# Patient Record
Sex: FEMALE | Race: WHITE | URBAN - METROPOLITAN AREA
[De-identification: names, ages, dates, MRNs, and addresses within clinical notes are randomized per-mention and may not be internally consistent; named-entity substitution may affect disease eponyms.]

---

## 2017-10-17 ENCOUNTER — HOSPITAL ENCOUNTER (EMERGENCY)
Facility: CLINIC | Age: 22
Discharge: HOME | End: 2017-10-17
Attending: INTERNAL MEDICINE | Admitting: INTERNAL MEDICINE

## 2017-10-17 VITALS
SYSTOLIC BLOOD PRESSURE: 117 MMHG | OXYGEN SATURATION: 99 % | RESPIRATION RATE: 18 BRPM | DIASTOLIC BLOOD PRESSURE: 74 MMHG | BODY MASS INDEX: 20.38 KG/M2 | TEMPERATURE: 98.2 F | HEIGHT: 63 IN | HEART RATE: 80 BPM | WEIGHT: 115 LBS

## 2017-10-17 DIAGNOSIS — F07.81 POST CONCUSSION SYNDROME: ICD-10-CM

## 2017-10-17 DIAGNOSIS — R42 DIZZINESS: ICD-10-CM

## 2017-10-17 LAB
ALBUMIN UR-MCNC: NEGATIVE MG/DL
APPEARANCE UR: CLEAR
BILIRUB UR QL STRIP: NEGATIVE
COLOR UR AUTO: NORMAL
GLUCOSE UR STRIP-MCNC: NEGATIVE MG/DL
HCG UR QL: NEGATIVE
HGB UR QL STRIP: NEGATIVE
INTERNAL QC OK POCT: YES
KETONES UR STRIP-MCNC: NEGATIVE MG/DL
LEUKOCYTE ESTERASE UR QL STRIP: NEGATIVE
NITRATE UR QL: NEGATIVE
PH UR STRIP: 5.5 PH (ref 5–7)
SOURCE: NORMAL
SP GR UR STRIP: 1.01 (ref 1–1.03)
UROBILINOGEN UR STRIP-MCNC: NORMAL MG/DL (ref 0–2)

## 2017-10-17 PROCEDURE — 93005 ELECTROCARDIOGRAM TRACING: CPT | Performed by: INTERNAL MEDICINE

## 2017-10-17 PROCEDURE — 81003 URINALYSIS AUTO W/O SCOPE: CPT | Performed by: INTERNAL MEDICINE

## 2017-10-17 PROCEDURE — 81025 URINE PREGNANCY TEST: CPT | Performed by: INTERNAL MEDICINE

## 2017-10-17 PROCEDURE — 99284 EMERGENCY DEPT VISIT MOD MDM: CPT | Performed by: INTERNAL MEDICINE

## 2017-10-17 PROCEDURE — 93010 ELECTROCARDIOGRAM REPORT: CPT | Mod: Z6 | Performed by: INTERNAL MEDICINE

## 2017-10-17 PROCEDURE — 99283 EMERGENCY DEPT VISIT LOW MDM: CPT | Mod: 25 | Performed by: INTERNAL MEDICINE

## 2017-10-17 ASSESSMENT — ENCOUNTER SYMPTOMS
SHORTNESS OF BREATH: 0
ABDOMINAL PAIN: 0
DIZZINESS: 1
HEADACHES: 1
FEVER: 0

## 2017-10-17 NOTE — ED PROVIDER NOTES
"  History     Chief Complaint   Patient presents with     Fall     head pain     Dizziness     HPI  Brittani Nash is an otherwise healthy 22 year old female who presents to the Emergency Department for evaluation of dizziness. The patient reports she had a witnessed fall down stairs on Saturday while intoxicated. She says she is unsure why she fell as she was drinking, but a witness reported she did not suffer any injury. The patient reports since that time she has experienced a headache with associated dizziness. She denies any visual disturbance, but does say her vision feels \"weird\" in her periphery. She denies any change of pregnancy.     Past Medical History:   Diagnosis Date     Allergic state      Scoliosis        History reviewed. No pertinent surgical history.    No family history on file.    Social History   Substance Use Topics     Smoking status: Never Smoker     Smokeless tobacco: Never Used     Alcohol use Yes       No current facility-administered medications for this encounter.      No current outpatient prescriptions on file.      Not on File      I have reviewed the Medications, Allergies, Past Medical and Surgical History, and Social History in the Epic system.    Review of Systems   Constitutional: Negative for fever.   Respiratory: Negative for shortness of breath.    Cardiovascular: Negative for chest pain.   Gastrointestinal: Negative for abdominal pain.   Neurological: Positive for dizziness and headaches.   All other systems reviewed and are negative.      Physical Exam   BP: 117/74  Pulse: 83  Heart Rate: 83  Temp: 98.2  F (36.8  C)  Resp: 16  Height: 160 cm (5' 3\")  Weight: 52.2 kg (115 lb)  SpO2: 99 %       Physical Exam   Constitutional: She is oriented to person, place, and time. No distress.   HENT:   Head: Atraumatic.   Mouth/Throat: Oropharynx is clear and moist. No oropharyngeal exudate.   Eyes: Pupils are equal, round, and reactive to light. No scleral icterus.   Neck: Neck " supple. No JVD present.   Cardiovascular: Normal rate, normal heart sounds and intact distal pulses.  Exam reveals no gallop and no friction rub.    No murmur heard.  Pulmonary/Chest: Effort normal and breath sounds normal. No respiratory distress. She has no wheezes. She has no rales. She exhibits no tenderness.   Abdominal: Soft. Bowel sounds are normal. She exhibits no distension and no mass. There is no tenderness. There is no rebound and no guarding.   Musculoskeletal: She exhibits no edema or tenderness.   Neurological: She is alert and oriented to person, place, and time. No cranial nerve deficit. Coordination normal.   Skin: Skin is warm. No rash noted. She is not diaphoretic.       ED Course     ED Course     Procedures             EKG Interpretation:      Interpreted by Dr. Anna  Time reviewed: 10:28 AM  Symptoms at time of EKG: Dizziness  Rhythm: normal sinus with sinus arrhythmia   Rate: normal  Axis: normal  Ectopy: none  Conduction: normal  ST Segments/ T Waves: No ST-T wave changes  Q Waves: none  Comparison to prior: No old EKG available    Clinical Impression: No long QT or Brugada pattern.     Results for orders placed or performed during the hospital encounter of 10/17/17 (from the past 24 hour(s))   EKG 12-lead, tracing only     Status: None (Preliminary result)    Collection Time: 10/17/17 10:20 AM   Result Value Ref Range    Interpretation ECG Click View Image link to view waveform and result    UA reflex to Microscopic and Culture     Status: None    Collection Time: 10/17/17 10:35 AM   Result Value Ref Range    Color Urine Straw     Appearance Urine Clear     Glucose Urine Negative NEG^Negative mg/dL    Bilirubin Urine Negative NEG^Negative    Ketones Urine Negative NEG^Negative mg/dL    Specific Gravity Urine 1.007 1.003 - 1.035    Blood Urine Negative NEG^Negative    pH Urine 5.5 5.0 - 7.0 pH    Protein Albumin Urine Negative NEG^Negative mg/dL    Urobilinogen mg/dL Normal 0.0 - 2.0 mg/dL     Nitrite Urine Negative NEG^Negative    Leukocyte Esterase Urine Negative NEG^Negative    Source Midstream Urine    hCG qual urine POCT     Status: Normal    Collection Time: 10/17/17 11:21 AM   Result Value Ref Range    HCG Qual Urine Negative neg    Internal QC OK Yes          Labs Ordered and Resulted from Time of ED Arrival Up to the Time of Departure from the ED   HCG QUAL URINE POCT - Normal   UA MACROSCOPIC WITH REFLEX TO MICRO AND CULTURE   CARDIAC CONTINUOUS MONITORING            Assessments & Plan (with Medical Decision Making)  Post concussion syndrome after intoxicated and fall Sat still HA and dizziness, no visual sxs, no anticoagulation-defer CT head knowing poss risk in the young pt and this was also discussed with the pt. No sports, follow up with TBI clinic.  Dizziness, UPT neg, UA neg, EKG without long QT, Brugada pattern or any acute pathologies, probably all due to concussion.       I have reviewed the nursing notes.    I have reviewed the findings, diagnosis, plan and need for follow up with the patient.    There are no discharge medications for this patient.      Final diagnoses:   Post concussion syndrome   Dizziness   I, Nima Olivier, am serving as a trained medical scribe to document services personally performed by Radha Anna MD, based on the provider's statements to me.      IRadha MD, was physically present and have reviewed and verified the accuracy of this note documented by Nima Olivier.       10/17/2017   [facility], [place], EMERGENCY DEPARTMENT     Radha Anna MD  10/17/17 2162

## 2017-10-17 NOTE — LETTER
To Whom it may concern:      Brittani Nash was seen in our Emergency Department today, 10/17/17.  I expect her condition to improve over the next 1 days.  She may return to work/school when improved.    Sincerely,        Radha Anna MD, MD

## 2017-10-17 NOTE — ED NOTES
22-yr female patient - presenting to ED for eval of fall (possible multiple falls) and hitting head on Saturday, last week.  Patient states etoh intoxication at time of fall; denies LOC.  Patient states some neck pain,a swell and body aches, and dizziness.  Airway patent.

## 2017-10-17 NOTE — ED AVS SNAPSHOT
[facility], Emergency Department    [address] [address]    Phone:  [phone]                                       Brittani Nash   MRN: [id number]    Department:  [facility], Emergency Department   Date of Visit:  10/17/2017           Patient Information     Date Of Birth          1995        Your diagnoses for this visit were:     Post concussion syndrome     Dizziness        You were seen by Radha Anna MD.      Discharge References/Attachments     MILD TRAUMATIC BRAIN INJURY (CONCUSSION), TREATMENT FOR  (ENGLISH)      24 Hour Appointment Hotline       To make an appointment at any [facility] clinic, call [phone] ([phone]). If you don't have a family doctor or clinic, we will help you find one. [facility] clinics are conveniently located to serve the needs of you and your family.          ED Discharge Orders     CONCUSSION  REFERRAL       [facility] is referring you to the Concussion  service at [facility].      The  Representative will assist you in the coordination of your concussion care as prescribed by your physician.    The  Representative will contact you within one business day, or you may contact the  Representative at [phone].    Referral Options:  Non-Sports related concussion management    Coverage of these services are subject to the terms and limitations of your health insurance plan.  Please call member services at your health plan with any benefit or coverage questions.     If X-rays, CT or MRI's have been performed, please contact the facility where they were done, to arrange for  prior to your scheduled appointment.  Please bring this referral request to your appointment and present it to your specialist.                     Review of your medicines      Notice     You have not been prescribed any medications.            Procedures and tests performed  "during your visit     Cardiac Continuous Monitoring    EKG 12-lead, tracing only    UA reflex to Microscopic and Culture    hCG qual urine POCT      Orders Needing Specimen Collection     None      Pending Results     Date and Time Order Name Status Description    10/17/2017 1000 EKG 12-lead, tracing only Preliminary             Pending Culture Results     No orders found from 10/15/2017 to 10/18/2017.            Pending Results Instructions     If you had any lab results that were not finalized at the time of your Discharge, you can call the ED Lab Result RN at [phone]. You will be contacted by this team for any positive Lab results or changes in treatment. The nurses are available 7 days a week from 10A to 6:30P.  You can leave a message 24 hours per day and they will return your call.        Thank you for choosing [facility]       Thank you for choosing [facility] for your care. Our goal is always to provide you with excellent care. Hearing back from our patients is one way we can continue to improve our services. Please take a few minutes to complete the written survey that you may receive in the mail after you visit with us. Thank you!        Avaz Information     Avaz lets you send messages to your doctor, view your test results, renew your prescriptions, schedule appointments and more. To sign up, go to [email or web address] . Click on \"Log in\" on the left side of the screen, which will take you to the Welcome page. Then click on \"Sign up Now\" on the right side of the page.     You will be asked to enter the access code listed below, as well as some personal information. Please follow the directions to create your username and password.     Your access code is: [email or web address]  Expires: 1/15/2018 11:50 AM     Your access code will  in 90 days. If you need help or a new code, please call your [facility] clinic or [phone].        Care EveryWhere ID     This is your Care EveryWhere ID. This could " be used by other organizations to access your [facility] medical records  [id number]        Equal Access to Services     JENNIE COOPER : Pham Blakely, rona joyce, ramón davis. So [facility] [phone].    ATENCIÓN: Si habla español, tiene a senior disposición servicios gratuitos de asistencia lingüística. Llame al [phone].    We comply with applicable federal civil rights laws and [place] laws. We do not discriminate on the basis of race, color, national origin, age, disability, sex, sexual orientation, or gender identity.            After Visit Summary       This is your record. Keep this with you and show to your community pharmacist(s) and doctor(s) at your next visit.

## 2017-10-17 NOTE — ED AVS SNAPSHOT
[facility], [place], Emergency Department    [address] [place] [address]    Phone:  [phone]                                       Brittani Nash   MRN: [id number]    Department:  [facility], Emergency Department   Date of Visit:  10/17/2017           After Visit Summary Signature Page     I have received my discharge instructions, and my questions have been answered. I have discussed any challenges I see with this plan with the nurse or doctor.    ..........................................................................................................................................  Patient/Patient Representative Signature      ..........................................................................................................................................  Patient Representative Print Name and Relationship to Patient    ..................................................               ................................................  Date                                            Time    ..........................................................................................................................................  Reviewed by Signature/Title    ...................................................              ..............................................  Date                                                            Time

## 2017-10-18 LAB — INTERPRETATION ECG - MUSE: NORMAL

## 2018-02-01 ENCOUNTER — MEDICAL CORRESPONDENCE (OUTPATIENT)
Dept: HEALTH INFORMATION MANAGEMENT | Facility: CLINIC | Age: 23
End: 2018-02-01

## 2018-02-01 ENCOUNTER — TRANSFERRED RECORDS (OUTPATIENT)
Dept: HEALTH INFORMATION MANAGEMENT | Facility: CLINIC | Age: 23
End: 2018-02-01

## 2018-02-01 NOTE — TELEPHONE ENCOUNTER
Records Received From: [facility]     Date/Exam/Location  (specify location if different)   ED/Hosp Notes: 10/28/08-11/2/08, 12/15/08-12/19/08   Missing: - missing pages

## 2018-02-01 NOTE — TELEPHONE ENCOUNTER
APPT INFO    Date /Time: 2/6/17 9:40AM   Reason for Appt: scoliosis   Ref Provider/Clinic: JEANNETTE HALL/[facility]    Are there internal records? Yes/No?  IF YES, list clinic names: NO   Are there outside records? Yes/No? YES - see below   Patient Contact (Y/N) & Call Details: YES - spoke with patient   Action: Emailed SHAHLA      OUTSIDE RECORDS CHECKLIST     CLINIC NAME COMMENTS REC (x) IMG (x)   [facility] Records scanned into PACS x    [facility] [place]  x x

## 2018-02-01 NOTE — TELEPHONE ENCOUNTER
Records Received From: [facility]     Date/Exam/Location  (specify location if different)   Office Notes: [phone]   Radiology Reports: - xray scoliosis 3/19/14, 3/14/16    Nidia Bellamy Notified (Y/N): no   PT/OT Notes: 2008, 2011, 2009, 2013, 2014

## 2018-02-02 DIAGNOSIS — M41.9 SCOLIOSIS: Primary | ICD-10-CM

## 2018-02-05 NOTE — TELEPHONE ENCOUNTER
Received Imaging From: [facility]    Image Type (x): Disc:_x__  Pacs:___      Exam Date/Name: Multiple images on CD Comments: sent to [place] film room

## 2018-02-06 ENCOUNTER — RADIANT APPOINTMENT (OUTPATIENT)
Dept: GENERAL RADIOLOGY | Facility: CLINIC | Age: 23
End: 2018-02-06
Attending: ORTHOPAEDIC SURGERY
Payer: COMMERCIAL

## 2018-02-06 ENCOUNTER — PRE VISIT (OUTPATIENT)
Dept: ORTHOPEDICS | Facility: CLINIC | Age: 23
End: 2018-02-06

## 2018-02-06 ENCOUNTER — OFFICE VISIT (OUTPATIENT)
Dept: ORTHOPEDICS | Facility: CLINIC | Age: 23
End: 2018-02-06
Payer: COMMERCIAL

## 2018-02-06 VITALS — HEIGHT: 64 IN | WEIGHT: 126.2 LBS | BODY MASS INDEX: 21.54 KG/M2

## 2018-02-06 DIAGNOSIS — M41.9 SCOLIOSIS: ICD-10-CM

## 2018-02-06 DIAGNOSIS — M41.125 ADOLESCENT IDIOPATHIC SCOLIOSIS OF THORACOLUMBAR REGION: Primary | ICD-10-CM

## 2018-02-06 ASSESSMENT — ENCOUNTER SYMPTOMS
BACK PAIN: 1
STIFFNESS: 0
ARTHRALGIAS: 0
MYALGIAS: 1
MUSCLE WEAKNESS: 0
NECK PAIN: 0
JOINT SWELLING: 0

## 2018-02-06 NOTE — MR AVS SNAPSHOT
"              After Visit Summary   2018    Brittani Nash    MRN: [id number]           Patient Information     Date Of Birth          1995        Visit Information        Provider Department      2018 9:40 AM Geoffrey Hill MD [facility]        Today's Diagnoses     Adolescent idiopathic scoliosis of thoracolumbar region    -  1       Follow-ups after your visit        Who to contact     Please call your clinic at [phone] to:    Ask questions about your health    Make or cancel appointments    Discuss your medicines    Learn about your test results    Speak to your doctor   If you have compliments or concerns about an experience at your clinic, or if you wish to file a complaint, please contact [facility] Patient Relations at [phone] or email us at [email or web address]         Additional Information About Your Visit        MyChart Information     SCRM is an electronic gateway that provides easy, online access to your medical records. With SCRM, you can request a clinic appointment, read your test results, renew a prescription or communicate with your care team.     To sign up for [employer] visit the website at [email or web address]   You will be asked to enter the access code listed below, as well as some personal information. Please follow the directions to create your username and password.     Your access code is: [email or web address]  Expires: 5/3/2018  6:31 AM     Your access code will  in 90 days. If you need help or a new code, please contact your [facility] or call [phone] for assistance.        Care EveryWhere ID     This is your Care EveryWhere ID. This could be used by other organizations to access your [facility] medical records  [id number]        Your Vitals Were     Height BMI (Body Mass Index)                1.626 m (5' 4\") 21.66 kg/m2           Blood Pressure from " Last 3 Encounters:   10/17/17 117/74    Weight from Last 3 Encounters:   02/06/18 57.2 kg (126 lb 3.2 oz)   10/17/17 52.2 kg (115 lb)              Today, you had the following     No orders found for display       Primary Care Provider Office Phone # Fax #    Paul Holland [phone]       [facility] [address]  [place] [address]        Equal Access to Services     CARLOS COOPER : Hadii aad ku hadasho Soomaali, waaxda luqadaha, qaybta kaalmada adeegyada, waxay idiin hayaan adeeg kharash la'aan . So [facility] [phone].    ATENCIÓN: Si habla español, tiene a senior disposición servicios gratuitos de asistencia lingüística. [facility] [phone].    We comply with applicable federal civil rights laws and [place] laws. We do not discriminate on the basis of race, color, national origin, age, disability, sex, sexual orientation, or gender identity.            Thank you!     Thank you for choosing [facility]  for your care. Our goal is always to provide you with excellent care. Hearing back from our patients is one way we can continue to improve our services. Please take a few minutes to complete the written survey that you may receive in the mail after your visit with us. Thank you!             Your Updated Medication List - Protect others around you: Learn how to safely use, store and throw away your medicines at www.disposemymeds.org.      Notice  As of 2/6/2018 10:34 AM    You have not been prescribed any medications.
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
## 2018-02-06 NOTE — PROGRESS NOTES
Spine Surgery Consultation    REFERRING PHYSICIAN: Edel Bello   PRIMARY CARE PHYSICIAN: Paul Holland           Chief Complaint:   Consult (pt states shes known that she has had scoliosis for awhile and was told that if she has any discomfort to come in and check on it and she states it has been progressing and flaring up over the pass year  with muscle spasms and sharp pains.)      History of Present Illness:  Symptom Profile Including: location of symptoms, onset, severity, exacerbating/alleviating factors, previous treatments:        Brittani Nash is a 22 year old female who presents for evaluation of idiopathic scoliosis.  She has been followed for years for idiopathic scoliosis.  Is diagnosed in her early teens when she had pneumonia and they got a chest x-ray.  She has never had to be braced.  She does not have any family history of scoliosis.  She has had intermittent midthoracic back pain which presents a spasms.  It does not radiate into the legs.  No numbness tingling or weakness.  No bowel or bladder deficits.  Her back does not limit her or prevent her from working her participating in school.  She is currently a student at the [employer].    Recently over the last several weeks she has been having more back spasms than usual.  Her past doctor had told her it was possible her scoliosis curve could worsen in the future, so she wanted to come in today to get x-rays to make sure that her scoliosis had not progressed.         Past Medical History:     Past Medical History:   Diagnosis Date     Allergic state      Scoliosis             Past Surgical History:   No past surgical history on file.         Social History:     Social History   Substance Use Topics     Smoking status: Never Smoker     Smokeless tobacco: Never Used     Alcohol use Yes            Family History:   No family history on file.         Allergies:   Not on File         Medications:     No current outpatient prescriptions on  "file.     No current facility-administered medications for this visit.              Review of Systems:     A 10 point ROS was performed and reviewed. Specific responses to these questions are noted at the end of the document.         Physical Exam:   Vitals: Ht 1.626 m (5' 4\")  Wt 57.2 kg (126 lb 3.2 oz)  BMI 21.66 kg/m2  Constitutional: awake, alert, cooperative, no apparent distress, appears stated age.    Eyes: The sclera are white.  Ears, Nose, Throat: The trachea is midline.  Psychiatric: The patient has a normal affect.  Respiratory: breathing non-labored  Cardiovascular: The extremities are warm and perfused.  Skin: no obvious rashes or lesions.  Musculoskeletal, Neurologic, and Spine:   The bilateral lower extremities are examined.  5 out of 5 in hip flexion, knee extension, knee flexion, tibialis anterior, ankle plantarflexion, extensor hallux longus and peroneal muscles.  Sensation intact from L3-S1.  +2 patellar and Achilles reflexes.  No clonus.  Negative straight leg raise.  She has intact abdominal reflexes.  On the forward bend test there is only a very mild thoracic prominence which is completely reducible on side bending.  She is mildly tender to palpation along the paraspinal muscles.         Imaging:   We ordered and independently reviewed new radiographs at this clinic visit. The results were discussed with the patient.  Findings include:    New AP and lateral scoliosis radiographs are ordered and reviewed today.  I did compare these against her previous March 2016 scoliosis films.  The magnitude of the scoliosis is overall unchanged.  She does have a right-sided thoracolumbar curvature from T12-L2 which measures 26 .  There is a compensatory left thoracic curvature above from T5-T12 measuring 18 .  Neutral sagittal alignment.             Assessment and Plan:   Assessment:  22 year old female with idiopathic scoliosis which is stable compared to previous radiographs and muscle spasm type back " pain.     Plan:  1. I reassured the patient that her scoliotic curvature is unchanged compared to films from roughly 2 years ago.  I told her it is reasonable to monitor this with x-rays every 3-5 years but that I think it is unlikely to ever be a problem for her.  2. With regards to her muscle spasm type pain I recommended Tylenol and anti-inflammatories.  For long-term back health I recommended core strengthening exercises such as yoga or Pilates.  If she would like a physical therapy referral for this I be happy to do so, but she was more interested in trying the group classes with yoga.  I think this is very reasonable.  If she does not improve or she has any questions in the future I am happy to see her back, otherwise follow-up as needed.    Respectfully,  Geoffrey Hill MD  Spine Surgery  [facility]      Answers for HPI/ROS submitted by the patient on 2/6/2018   General Symptoms: No  Skin Symptoms: No  HENT Symptoms: No  EYE SYMPTOMS: No  HEART SYMPTOMS: No  LUNG SYMPTOMS: No  INTESTINAL SYMPTOMS: No  URINARY SYMPTOMS: No  GYNECOLOGIC SYMPTOMS: No  BREAST SYMPTOMS: No  SKELETAL SYMPTOMS: Yes  BLOOD SYMPTOMS: No  NERVOUS SYSTEM SYMPTOMS: No  MENTAL HEALTH SYMPTOMS: No  Back pain: Yes  Muscle aches: Yes  Neck pain: No  Swollen joints: No  Joint pain: No  Bone pain: No  Muscle weakness: No  Joint stiffness: No  Bone fracture: No

## 2018-02-06 NOTE — LETTER
2/6/2018       RE: Brittani Nash  [address]  [facility] [id number]     Dear Colleague,    Thank you for referring your patient, Brittani Nash, to the [facility] at [facility]. Please see a copy of my visit note below.    Spine Surgery Consultation    REFERRING PHYSICIAN: Edel Bello   PRIMARY CARE PHYSICIAN: Paul Holland           Chief Complaint:   Consult (pt states shes known that she has had scoliosis for awhile and was told that if she has any discomfort to come in and check on it and she states it has been progressing and flaring up over the pass year  with muscle spasms and sharp pains.)      History of Present Illness:  Symptom Profile Including: location of symptoms, onset, severity, exacerbating/alleviating factors, previous treatments:        Brittani Nash is a 22 year old female who presents for evaluation of idiopathic scoliosis.  She has been followed for years for idiopathic scoliosis.  Is diagnosed in her early teens when she had pneumonia and they got a chest x-ray.  She has never had to be braced.  She does not have any family history of scoliosis.  She has had intermittent midthoracic back pain which presents a spasms.  It does not radiate into the legs.  No numbness tingling or weakness.  No bowel or bladder deficits.  Her back does not limit her or prevent her from working her participating in school.  She is currently a student at the [employer].    Recently over the last several weeks she has been having more back spasms than usual.  Her past doctor had told her it was possible her scoliosis curve could worsen in the future, so she wanted to come in today to get x-rays to make sure that her scoliosis had not progressed.         Past Medical History:     Past Medical History:   Diagnosis Date     Allergic state      Scoliosis             Past Surgical History:   No past surgical history on file.          "Social History:     Social History   Substance Use Topics     Smoking status: Never Smoker     Smokeless tobacco: Never Used     Alcohol use Yes            Family History:   No family history on file.         Allergies:   Not on File         Medications:     No current outpatient prescriptions on file.     No current facility-administered medications for this visit.              Review of Systems:     A 10 point ROS was performed and reviewed. Specific responses to these questions are noted at the end of the document.         Physical Exam:   Vitals: Ht 1.626 m (5' 4\")  Wt 57.2 kg (126 lb 3.2 oz)  BMI 21.66 kg/m2  Constitutional: awake, alert, cooperative, no apparent distress, appears stated age.    Eyes: The sclera are white.  Ears, Nose, Throat: The trachea is midline.  Psychiatric: The patient has a normal affect.  Respiratory: breathing non-labored  Cardiovascular: The extremities are warm and perfused.  Skin: no obvious rashes or lesions.  Musculoskeletal, Neurologic, and Spine:   The bilateral lower extremities are examined.  5 out of 5 in hip flexion, knee extension, knee flexion, tibialis anterior, ankle plantarflexion, extensor hallux longus and peroneal muscles.  Sensation intact from L3-S1.  +2 patellar and Achilles reflexes.  No clonus.  Negative straight leg raise.  She has intact abdominal reflexes.  On the forward bend test there is only a very mild thoracic prominence which is completely reducible on side bending.  She is mildly tender to palpation along the paraspinal muscles.         Imaging:   We ordered and independently reviewed new radiographs at this clinic visit. The results were discussed with the patient.  Findings include:    New AP and lateral scoliosis radiographs are ordered and reviewed today.  I did compare these against her previous March 2016 scoliosis films.  The magnitude of the scoliosis is overall unchanged.  She does have a right-sided thoracolumbar curvature from T12-L2 " which measures 26 .  There is a compensatory left thoracic curvature above from T5-T12 measuring 18 .  Neutral sagittal alignment.             Assessment and Plan:   Assessment:  22 year old female with idiopathic scoliosis which is stable compared to previous radiographs and muscle spasm type back pain.     Plan:  1. I reassured the patient that her scoliotic curvature is unchanged compared to films from roughly 2 years ago.  I told her it is reasonable to monitor this with x-rays every 3-5 years but that I think it is unlikely to ever be a problem for her.  2. With regards to her muscle spasm type pain I recommended Tylenol and anti-inflammatories.  For long-term back health I recommended core strengthening exercises such as yoga or Pilates.  If she would like a physical therapy referral for this I be happy to do so, but she was more interested in trying the group classes with yoga.  I think this is very reasonable.  If she does not improve or she has any questions in the future I am happy to see her back, otherwise follow-up as needed.    Respectfully,  Geoffrey Hill MD  Spine Surgery  [facility]